# Patient Record
Sex: FEMALE | Race: BLACK OR AFRICAN AMERICAN | NOT HISPANIC OR LATINO | Employment: UNEMPLOYED | URBAN - METROPOLITAN AREA
[De-identification: names, ages, dates, MRNs, and addresses within clinical notes are randomized per-mention and may not be internally consistent; named-entity substitution may affect disease eponyms.]

---

## 2017-08-15 ENCOUNTER — TELEPHONE (OUTPATIENT)
Dept: DERMATOLOGY | Facility: CLINIC | Age: 32
End: 2017-08-15

## 2017-12-04 ENCOUNTER — TELEPHONE (OUTPATIENT)
Dept: DERMATOLOGY | Facility: CLINIC | Age: 32
End: 2017-12-04

## 2017-12-04 NOTE — TELEPHONE ENCOUNTER
----- Message from Judie Rosen sent at 12/4/2017  9:09 AM CST -----  Contact: pt  Returning a call.

## 2017-12-05 ENCOUNTER — OFFICE VISIT (OUTPATIENT)
Dept: DERMATOLOGY | Facility: CLINIC | Age: 32
End: 2017-12-05
Payer: MEDICAID

## 2017-12-05 DIAGNOSIS — L81.9 DYSCHROMIA: Primary | ICD-10-CM

## 2017-12-05 PROCEDURE — 99999 PR PBB SHADOW E&M-EST. PATIENT-LVL II: CPT | Mod: PBBFAC,,, | Performed by: DERMATOLOGY

## 2017-12-05 PROCEDURE — 99213 OFFICE O/P EST LOW 20 MIN: CPT | Mod: S$PBB,,, | Performed by: DERMATOLOGY

## 2017-12-05 PROCEDURE — 99212 OFFICE O/P EST SF 10 MIN: CPT | Mod: PBBFAC,PO | Performed by: DERMATOLOGY

## 2017-12-05 NOTE — PROGRESS NOTES
Subjective:       Patient ID:  Rosie South is a 32 y.o. female who presents for   Chief Complaint   Patient presents with    Skin Discoloration     c/o darkening of bilateral hands and feet x 3 yrs.      Hx of recurrent hyperpigmentation c/ scaling of the bilateral hands and feet x 4 years, last seen on 9/22/15.  She was initially seen by Dr. Morse.  She denies improvement in skin.  Denies pruritus or erythema.  Denies swelling in 2 years.      MARII, anti scleroderma, SSA, SSB, CBC amd CMP were wnl on 6/1/15.      Prior tx: TAC 0.1% ointment, amlactin        Review of Systems   Constitutional: Negative for fever and chills.   Gastrointestinal: Negative for nausea and vomiting.   Skin: Positive for rash. Negative for itching, daily sunscreen use, activity-related sunscreen use and recent sunburn.   Hematologic/Lymphatic: Does not bruise/bleed easily.        Objective:    Physical Exam   Constitutional: She appears well-developed and well-nourished. No distress.   Neurological: She is alert and oriented to person, place, and time. She is not disoriented.   Psychiatric: She has a normal mood and affect.   Skin:   Areas Examined (abnormalities noted in diagram):   Head / Face Inspection Performed  Neck Inspection Performed  RUE Inspected  LUE Inspection Performed  Nails and Digits Inspection Performed                Assessment / Plan:        Dyschromia    Pt endorses occasional use of OTC Goody powder.  Recommend d/c OTC NSAID use. Possible FDE.  Prior CTD serologies negative.  Will treat residual dyschromia with HQ 8%.  If flares again (swelling, scaling), recommend pt return for biopsy. The patient acknowledged understanding.            Return in about 3 months (around 3/5/2018).